# Patient Record
Sex: MALE | Race: WHITE | NOT HISPANIC OR LATINO | URBAN - NONMETROPOLITAN AREA
[De-identification: names, ages, dates, MRNs, and addresses within clinical notes are randomized per-mention and may not be internally consistent; named-entity substitution may affect disease eponyms.]

---

## 2023-04-28 ENCOUNTER — APPOINTMENT (OUTPATIENT)
Dept: GENERAL RADIOLOGY | Facility: HOSPITAL | Age: 53
End: 2023-04-28
Payer: MEDICAID

## 2023-04-28 ENCOUNTER — HOSPITAL ENCOUNTER (EMERGENCY)
Facility: HOSPITAL | Age: 53
Discharge: HOME | End: 2023-04-28
Attending: EMERGENCY MEDICINE
Payer: MEDICAID

## 2023-04-28 VITALS
SYSTOLIC BLOOD PRESSURE: 114 MMHG | HEART RATE: 95 BPM | TEMPERATURE: 97.3 F | WEIGHT: 298 LBS | OXYGEN SATURATION: 91 % | HEIGHT: 73 IN | DIASTOLIC BLOOD PRESSURE: 76 MMHG | BODY MASS INDEX: 39.49 KG/M2 | RESPIRATION RATE: 22 BRPM

## 2023-04-28 DIAGNOSIS — R09.02 HYPOXEMIA: Primary | ICD-10-CM

## 2023-04-28 DIAGNOSIS — I48.11 LONGSTANDING PERSISTENT ATRIAL FIBRILLATION: ICD-10-CM

## 2023-04-28 LAB
A-A DO2: 45.2 MMHG (ref 0–300)
ALBUMIN SERPL-MCNC: 3.4 G/DL (ref 3.5–5.2)
ALBUMIN/GLOB SERPL: 1.2 G/DL
ALP SERPL-CCNC: 65 U/L (ref 39–117)
ALT SERPL W P-5'-P-CCNC: 50 U/L (ref 1–41)
ANION GAP SERPL CALCULATED.3IONS-SCNC: 14.6 MMOL/L (ref 5–15)
APTT PPP: 26.3 SECONDS (ref 26.5–34.5)
ARTERIAL PATENCY WRIST A: POSITIVE
AST SERPL-CCNC: 36 U/L (ref 1–40)
ATMOSPHERIC PRESS: 720 MMHG
BACTERIA UR QL AUTO: ABNORMAL /HPF
BASE EXCESS BLDA CALC-SCNC: 1.1 MMOL/L (ref 0–2)
BASOPHILS # BLD AUTO: 0.05 10*3/MM3 (ref 0–0.2)
BASOPHILS NFR BLD AUTO: 0.9 % (ref 0–1.5)
BDY SITE: ABNORMAL
BILIRUB SERPL-MCNC: 3.7 MG/DL (ref 0–1.2)
BILIRUB UR QL STRIP: ABNORMAL
BUN SERPL-MCNC: 14 MG/DL (ref 6–20)
BUN/CREAT SERPL: 12.3 (ref 7–25)
CALCIUM SPEC-SCNC: 8.8 MG/DL (ref 8.6–10.5)
CHLORIDE SERPL-SCNC: 102 MMOL/L (ref 98–107)
CLARITY UR: CLEAR
CO2 BLDA-SCNC: 25.2 MMOL/L (ref 22–33)
CO2 SERPL-SCNC: 22.4 MMOL/L (ref 22–29)
COHGB MFR BLD: 0.4 % (ref 0–5)
COLOR UR: ABNORMAL
CREAT SERPL-MCNC: 1.14 MG/DL (ref 0.76–1.27)
D-LACTATE SERPL-SCNC: 1.6 MMOL/L (ref 0.5–2)
DEPRECATED RDW RBC AUTO: 48.9 FL (ref 37–54)
EGFRCR SERPLBLD CKD-EPI 2021: 77.4 ML/MIN/1.73
EOSINOPHIL # BLD AUTO: 0.15 10*3/MM3 (ref 0–0.4)
EOSINOPHIL NFR BLD AUTO: 2.6 % (ref 0.3–6.2)
ERYTHROCYTE [DISTWIDTH] IN BLOOD BY AUTOMATED COUNT: 14.5 % (ref 12.3–15.4)
FLUAV RNA RESP QL NAA+PROBE: NOT DETECTED
FLUBV RNA RESP QL NAA+PROBE: NOT DETECTED
GEN 5 2HR TROPONIN T REFLEX: 16 NG/L
GLOBULIN UR ELPH-MCNC: 2.9 GM/DL
GLUCOSE SERPL-MCNC: 100 MG/DL (ref 65–99)
GLUCOSE UR STRIP-MCNC: NEGATIVE MG/DL
HCO3 BLDA-SCNC: 24.2 MMOL/L (ref 20–26)
HCT VFR BLD AUTO: 39.8 % (ref 37.5–51)
HCT VFR BLD CALC: 40.7 % (ref 38–51)
HGB BLD-MCNC: 13.1 G/DL (ref 13–17.7)
HGB BLDA-MCNC: 13.3 G/DL (ref 14–18)
HGB UR QL STRIP.AUTO: ABNORMAL
HOLD SPECIMEN: NORMAL
HOLD SPECIMEN: NORMAL
HYALINE CASTS UR QL AUTO: ABNORMAL /LPF
IMM GRANULOCYTES # BLD AUTO: 0.06 10*3/MM3 (ref 0–0.05)
IMM GRANULOCYTES NFR BLD AUTO: 1.1 % (ref 0–0.5)
INHALED O2 CONCENTRATION: 21 %
INR PPP: 1.06 (ref 0.9–1.1)
KETONES UR QL STRIP: ABNORMAL
LEUKOCYTE ESTERASE UR QL STRIP.AUTO: NEGATIVE
LIPASE SERPL-CCNC: 32 U/L (ref 13–60)
LYMPHOCYTES # BLD AUTO: 1.82 10*3/MM3 (ref 0.7–3.1)
LYMPHOCYTES NFR BLD AUTO: 32 % (ref 19.6–45.3)
Lab: ABNORMAL
MCH RBC QN AUTO: 30.6 PG (ref 26.6–33)
MCHC RBC AUTO-ENTMCNC: 32.9 G/DL (ref 31.5–35.7)
MCV RBC AUTO: 93 FL (ref 79–97)
METHGB BLD QL: 0 % (ref 0–3)
MODALITY: ABNORMAL
MONOCYTES # BLD AUTO: 0.97 10*3/MM3 (ref 0.1–0.9)
MONOCYTES NFR BLD AUTO: 17 % (ref 5–12)
MUCOUS THREADS URNS QL MICRO: ABNORMAL /HPF
NEUTROPHILS NFR BLD AUTO: 2.64 10*3/MM3 (ref 1.7–7)
NEUTROPHILS NFR BLD AUTO: 46.4 % (ref 42.7–76)
NITRITE UR QL STRIP: NEGATIVE
NOTE: ABNORMAL
NOTIFIED BY: ABNORMAL
NOTIFIED WHO: ABNORMAL
NRBC BLD AUTO-RTO: 0.4 /100 WBC (ref 0–0.2)
NT-PROBNP SERPL-MCNC: 235.1 PG/ML (ref 0–900)
OXYHGB MFR BLDV: 88.6 % (ref 94–99)
PCO2 BLDA: 33.3 MM HG (ref 35–45)
PCO2 TEMP ADJ BLD: ABNORMAL MM[HG]
PH BLDA: 7.47 PH UNITS (ref 7.35–7.45)
PH UR STRIP.AUTO: 5.5 [PH] (ref 5–8)
PH, TEMP CORRECTED: ABNORMAL
PLATELET # BLD AUTO: 152 10*3/MM3 (ref 140–450)
PMV BLD AUTO: 10.5 FL (ref 6–12)
PO2 BLDA: 58.4 MM HG (ref 83–108)
PO2 TEMP ADJ BLD: ABNORMAL MM[HG]
POTASSIUM SERPL-SCNC: 3.5 MMOL/L (ref 3.5–5.2)
PROT SERPL-MCNC: 6.3 G/DL (ref 6–8.5)
PROT UR QL STRIP: ABNORMAL
PROTHROMBIN TIME: 14.3 SECONDS (ref 12.1–14.7)
QT INTERVAL: 380 MS
QTC INTERVAL: 485 MS
RBC # BLD AUTO: 4.28 10*6/MM3 (ref 4.14–5.8)
RBC # UR STRIP: ABNORMAL /HPF
REF LAB TEST METHOD: ABNORMAL
SAO2 % BLDCOA: 89 % (ref 94–99)
SARS-COV-2 RNA RESP QL NAA+PROBE: NOT DETECTED
SODIUM SERPL-SCNC: 139 MMOL/L (ref 136–145)
SP GR UR STRIP: 1.02 (ref 1–1.03)
SQUAMOUS #/AREA URNS HPF: ABNORMAL /HPF
TROPONIN T DELTA: -2 NG/L
TROPONIN T SERPL HS-MCNC: 18 NG/L
TROPONIN T SERPL HS-MCNC: 18 NG/L
UROBILINOGEN UR QL STRIP: ABNORMAL
VENTILATOR MODE: ABNORMAL
WBC # UR STRIP: ABNORMAL /HPF
WBC NRBC COR # BLD: 5.69 10*3/MM3 (ref 3.4–10.8)
WHOLE BLOOD HOLD COAG: NORMAL
WHOLE BLOOD HOLD SPECIMEN: NORMAL

## 2023-04-28 PROCEDURE — 85025 COMPLETE CBC W/AUTO DIFF WBC: CPT | Performed by: NURSE PRACTITIONER

## 2023-04-28 PROCEDURE — 87147 CULTURE TYPE IMMUNOLOGIC: CPT | Performed by: EMERGENCY MEDICINE

## 2023-04-28 PROCEDURE — 84484 ASSAY OF TROPONIN QUANT: CPT | Performed by: EMERGENCY MEDICINE

## 2023-04-28 PROCEDURE — 94640 AIRWAY INHALATION TREATMENT: CPT

## 2023-04-28 PROCEDURE — 93010 ELECTROCARDIOGRAM REPORT: CPT | Performed by: INTERNAL MEDICINE

## 2023-04-28 PROCEDURE — 83880 ASSAY OF NATRIURETIC PEPTIDE: CPT | Performed by: NURSE PRACTITIONER

## 2023-04-28 PROCEDURE — 87150 DNA/RNA AMPLIFIED PROBE: CPT | Performed by: EMERGENCY MEDICINE

## 2023-04-28 PROCEDURE — 87040 BLOOD CULTURE FOR BACTERIA: CPT | Performed by: EMERGENCY MEDICINE

## 2023-04-28 PROCEDURE — 36415 COLL VENOUS BLD VENIPUNCTURE: CPT

## 2023-04-28 PROCEDURE — 82805 BLOOD GASES W/O2 SATURATION: CPT

## 2023-04-28 PROCEDURE — 93005 ELECTROCARDIOGRAM TRACING: CPT | Performed by: NURSE PRACTITIONER

## 2023-04-28 PROCEDURE — 36600 WITHDRAWAL OF ARTERIAL BLOOD: CPT

## 2023-04-28 PROCEDURE — 84484 ASSAY OF TROPONIN QUANT: CPT | Performed by: NURSE PRACTITIONER

## 2023-04-28 PROCEDURE — 99283 EMERGENCY DEPT VISIT LOW MDM: CPT

## 2023-04-28 PROCEDURE — 82375 ASSAY CARBOXYHB QUANT: CPT

## 2023-04-28 PROCEDURE — 85730 THROMBOPLASTIN TIME PARTIAL: CPT | Performed by: NURSE PRACTITIONER

## 2023-04-28 PROCEDURE — 71045 X-RAY EXAM CHEST 1 VIEW: CPT

## 2023-04-28 PROCEDURE — 71045 X-RAY EXAM CHEST 1 VIEW: CPT | Performed by: RADIOLOGY

## 2023-04-28 PROCEDURE — 80053 COMPREHEN METABOLIC PANEL: CPT | Performed by: NURSE PRACTITIONER

## 2023-04-28 PROCEDURE — 81001 URINALYSIS AUTO W/SCOPE: CPT | Performed by: EMERGENCY MEDICINE

## 2023-04-28 PROCEDURE — 83690 ASSAY OF LIPASE: CPT | Performed by: NURSE PRACTITIONER

## 2023-04-28 PROCEDURE — 94799 UNLISTED PULMONARY SVC/PX: CPT

## 2023-04-28 PROCEDURE — 83605 ASSAY OF LACTIC ACID: CPT | Performed by: NURSE PRACTITIONER

## 2023-04-28 PROCEDURE — 87636 SARSCOV2 & INF A&B AMP PRB: CPT | Performed by: EMERGENCY MEDICINE

## 2023-04-28 PROCEDURE — 83050 HGB METHEMOGLOBIN QUAN: CPT

## 2023-04-28 PROCEDURE — 85610 PROTHROMBIN TIME: CPT | Performed by: NURSE PRACTITIONER

## 2023-04-28 RX ORDER — ALBUTEROL SULFATE 2.5 MG/3ML
2.5 SOLUTION RESPIRATORY (INHALATION) ONCE
Status: COMPLETED | OUTPATIENT
Start: 2023-04-28 | End: 2023-04-28

## 2023-04-28 RX ADMIN — ALBUTEROL SULFATE 2.5 MG: 2.5 SOLUTION RESPIRATORY (INHALATION) at 17:20

## 2023-04-28 NOTE — ED NOTES
MEDICAL SCREENING:    Reason for Visit: Patient is c/o increasing SOB with exertion. States this has worsened over the last few days. States he is taking chemo pills for multiple myeloma, which is in remission.     Patient initially seen in triage. The patient was advised further evaluation and diagnostic testing will be needed, some of the treatment and testing will be initiated in the lobby in order to begin the process. The patient will be returned to the waiting area for the time being and possibly be re-assessed by a subsequent ED provider. The patient will be brought back to the treatment area in as timely manner as possible.        Consuelo Maldonado, APRN  04/28/23 3453

## 2023-04-28 NOTE — CASE MANAGEMENT/SOCIAL WORK
Case Management/Social Work    Patient Name:  Devin Hernandez  YOB: 1970  MRN: [id number]  Admit Date:  4/28/2023    SS received call on-call for HCPM. SS spoke with Dr. Vaca who states pt needs home oxygen arranged. SS spoke with pt spouse Gloria via phone who states she would want home oxygen arranged through [facility]. SS contacted [facility] [phone] per Martínez who states for SS to fax note of pt being SOB, Order for Home oxygen of how many liters pt will need, and room air SAT. SS notified RN about needing Home o2 order, and if pt will need portable o2 for transfer home. RN plans to contact SS back once she speaks with Physician.     19:53: SS received call from RN who states Dr. Vaca had wrote o2 order on script. SS informed RN that o2 order would need to be placed in [facility] so SS could fax order to [phone].     20:07: SS faxed oxygen order to [phone] and contacted [facility]. Per Martínez who states It would be about an hour and a half before he could get portable oxygen delivered to hospital. SS provided Martínez with pt address to deliver home oxygen to pt home.. SS notified RN and made aware. RN states she would give o2 order that was written on script to Martínez with [facility] incase order that was fax was not accepted.     21:34: SS received call from [facility] with [employer] when he arrived to hospital pt had been discharged. Martínez states he would contact pt spouse and let them know he would be dropping off home oxygen.     Electronically signed by:  FROYLAN Murillo  04/28/23 18:48 EDT

## 2023-04-28 NOTE — ED PROVIDER NOTES
Subjective   History of Present Illness  Presents to ER with shortness of breath. Hx of Multiple myeloma, Covid-19 infection 2 years ago, with residual scarring in lungs. Also, has history chronic atrial fibrillation    Shortness of Breath  Severity:  Moderate  Onset quality:  Gradual  Timing:  Constant  Progression:  Worsening  Chronicity:  Chronic  Context: activity, pollens, smoke exposure and weather changes    Relieved by:  Rest  Worsened by:  Exertion and movement  Ineffective treatments:  None tried  Associated symptoms: no cough and no fever        Review of Systems   Constitutional: Positive for activity change and fatigue. Negative for fever.   HENT: Negative.    Eyes: Negative.    Respiratory: Positive for shortness of breath. Negative for cough.    Cardiovascular: Negative.    Gastrointestinal: Negative.    Endocrine: Negative.    Genitourinary: Negative.    Musculoskeletal: Negative.    Skin: Positive for color change.   Allergic/Immunologic: Negative.    Neurological: Negative.        No past medical history on file.    No Known Allergies    No past surgical history on file.    No family history on file.    Social History     Socioeconomic History   • Marital status:            Objective   Physical Exam  Vitals and nursing note reviewed.   HENT:      Head: Normocephalic.      Mouth/Throat:      Mouth: Mucous membranes are moist.   Eyes:      Pupils: Pupils are equal, round, and reactive to light.   Cardiovascular:      Rate and Rhythm: Rhythm irregular.   Pulmonary:      Effort: Tachypnea present.      Breath sounds: Examination of the left-middle field reveals decreased breath sounds. Examination of the right-lower field reveals decreased breath sounds. Examination of the left-lower field reveals decreased breath sounds. Decreased breath sounds present.   Abdominal:      Palpations: Abdomen is soft.   Musculoskeletal:      Cervical back: Normal range of motion.      Right lower leg: Edema  present.      Left lower leg: Edema present.   Skin:     General: Skin is dry.      Capillary Refill: Capillary refill takes less than 2 seconds.      Findings: Erythema present.   Neurological:      General: No focal deficit present.      Mental Status: He is alert.   Psychiatric:         Mood and Affect: Mood normal.         Procedures           ED Course  ED Course as of 04/28/23 2024 Fri Apr 28, 2023   1829 CXR : cardiomegaly [ZAK]   1829 ECG 17:32 Atrial fibrillation, ventr. Rate 98. Nonspecific T wave abnormality. QT/qTc 380/485 [ZAK]   1831 Dr Burden consulted about chronic atrial fibrillation, and whether needs anticoagulation. [ZAK]   1922 Need home oxygen [ZAK]      ED Course User Index  [ZAK] Geoffrey Vaca MD                                           [facility]    Final diagnoses:   None       ED Disposition  ED Disposition     None          No follow-up provider specified.       Medication List      No changes were made to your prescriptions during this visit.

## 2023-04-29 LAB
BACTERIA BLD CULT: ABNORMAL
BOTTLE TYPE: ABNORMAL

## 2023-04-29 NOTE — DISCHARGE PLACEMENT REQUEST
"Pete Sanabria (52 y.o. Male)     Date of Birth   1970    Social Security Number       Address   [address] [place] [address]    Home Phone   [phone]    MRN   [id number]       Synagogue   None    Marital Status                               Admission Date   4/28/23    Admission Type   Emergency    Admitting Provider       Attending Provider   Geoffrey Vaca MD    Department, Room/Bed   [facility] Emergency Department, 116/16       Discharge Date       Discharge Disposition       Discharge Destination                               Attending Provider: Geoffrey Vaca MD    Allergies: No Known Allergies    Isolation: None   Infection: None   Code Status: Not on file    Ht: 185.4 cm (73\")   Wt: 135 kg (298 lb)    Admission Cmt: None   Principal Problem: None                Active Insurance as of 4/28/2023     Primary Coverage     Payor Plan Insurance Group Employer/Plan Group    [facility] COMMUNITY PLAN [facility] COMMUNITY PLAN [facility] [id number]     Payor Plan Address Payor Plan Phone Number Payor Plan Fax Number Effective Dates    [address]   1/1/2023 - None Entered    [facility] [address]       Subscriber Name Subscriber Birth Date Member ID       PETE SANABRIA 1970 [id number]                 Emergency Contacts      (Rel.) Home Phone Work Phone Mobile Phone    allie sanabria (Spouse) [phone] -- --            Insurance Information                [facility] COMMUNITY PLAN [facility] PLAN [facility] Phone: --    Subscriber: Pete Sanabria Subscriber#: [id number]    Group#: [id number] Precert#: --          Oxygen Therapy (last day)     Date/Time SpO2 Device (Oxygen Therapy) Flow (L/min) Oxygen Concentration (%) ETCO2 (mmHg)    04/28/23 1820 91 -- -- -- --    04/28/23 1800 87 -- -- -- --    04/28/23 1740 92 -- -- -- --    04/28/23 1730 99 -- -- -- --    04/28/23 1720 92 room air -- -- --    04/28/23 1707 94 -- -- -- --    04/28/23 1629 91 room air -- -- " --          [facility] Emergency Department  [address] [facility] [address]  Phone:  [phone]  Fax:          Patient: ROOM: [facility]   Devin Hernandez MRN:  [id number]   [address] [place] [id number] :  1970  SSN:    Phone: [phone] Sex:  M   PCP: Provider, No Known                Emergency Contact Information      Name Relation Home Work Mobile     allie hernandez Spouse [phone]              INSURANCE PAYOR PLAN GROUP # SUBSCRIBER ID   Primary:    [facility] COMMUNITY PLAN [facility] [id number]   Admitting Diagnosis:   Order Date:  2023        Case Management  Consult       (Order ID: [id number])     Diagnosis:         Priority:  STAT Expected Date:   Expiration Date:        Interval:   Count:    Reason for Consult: home oxygen 2L [place]        Verbal Order Mode: Verbal with readback   Authorizing Provider: Geoffrey Vaca MD  Authorizing Provider's NPI: [id number]     Order Entered By: Cheri Danielle RN 2023  7:59 PM     Electronically signed by:

## 2023-04-29 NOTE — ED NOTES
Patient informed it would be an hour before oxygen could be delivered.  Patient stated he does not want to wait and was discharged per his request.

## 2023-04-30 LAB
BACTERIA SPEC AEROBE CULT: ABNORMAL
GRAM STN SPEC: ABNORMAL
ISOLATED FROM: ABNORMAL

## 2023-05-03 LAB — BACTERIA SPEC AEROBE CULT: NORMAL
